# Patient Record
Sex: MALE | Race: WHITE | NOT HISPANIC OR LATINO | Employment: FULL TIME | URBAN - METROPOLITAN AREA
[De-identification: names, ages, dates, MRNs, and addresses within clinical notes are randomized per-mention and may not be internally consistent; named-entity substitution may affect disease eponyms.]

---

## 2021-04-26 ENCOUNTER — OFFICE VISIT CONVERTED (OUTPATIENT)
Dept: UROLOGY | Facility: CLINIC | Age: 36
End: 2021-04-26
Attending: UROLOGY

## 2021-05-11 NOTE — H&P
History and Physical      Patient Name: Babatunde Jane   Patient ID: [id number]   Sex: Male   YOB: 1985        Visit Date: April 26, 2021    Provider: Jarrell Woodson MD   Location: [facility]   Location Address: [address] [place] [address]  [id number]   Location Phone: [phone]          Chief Complaint  · urological issues  · The patient is here for a vasectomy consultation.      History Of Present Illness  Babatunde Jane is here for counseling regarding vasectomy for permanent sterilization.   The procedure was discussed with the patient. Babatunde Jane is aware that the procedure should be considered irreversible, although at times it can be reversed with success. Risks for the procedure including local effects of selling, bleeding, pain, the possibility for recanalization, and continued fertility is also possible. Formation of a sperm granuloma also is a possibility. We discussed the procedure, preoperative preparation, and postoperative care. We also discussed the importance of continuing to use contraception post vasectomy, since the patient will not be sterile at that point. We stressed the importance of continuing to use contraception until a follow-up semen specimen is done that shows the absence of sperm. That specimen will normally be obtained eight weeks post-surgery.   Babatunde Jane is voluntarily requesting this procedure and understands that if it is successful he will be unable to father children.   He has no cardiopulmonary history, no prior scrotal surgery, no blood thinners.          pt does have an area of condyloma centimeter above the penis that has been there for greater than 15 years.  Has not changed in greater than 10 years as far size.  Not bothering him.       Review of Systems  · Constitutional  o Denies  o : fever, chills  · Eyes  o Denies  o : double vision, cataracts  · HENT  o Denies  o : headaches, hearing  "loss  · Cardiovascular  o Denies  o : chest pain, irregular heart beats, dyspnea on exertion, chest pain on exertion  · Respiratory  o Denies  o : shortness of breath, wheezing, cough  · Gastrointestinal  o Denies  o : nausea, vomiting, diarrhea, heartburn  · Genitourinary  o Denies  o : scrotal mass, penile discharge, scrotal abnormalities  · Integument  o Denies  o : rash, itching, new skin lesions  · Endocrine  o Denies  o : weight gain, weight loss  · Psychiatric  o Denies  o : anxiety, depression, difficulty sleeping      Vitals  Date Time BP Position Site L\R Cuff Size HR RR TEMP (F) WT  HT  BMI kg/m2 BSA m2 O2 Sat FR L/min FiO2        04/26/2021 01:40 PM       13  206lbs 6oz 5'  7\" 32.32 2.1             Physical Examination  · Constitutional  o Appearance  o : well developed, well-nourished, in no acute distress.   · Neck  o Inspection/Palpation  o : normal appearance, no masses or tenderness, trachea midline  · Respiratory  o Respiratory Effort  o : breathing unlabored  o Auscultation of Lungs  o : clear to ascultation bilaterally  · Cardiovascular  o Heart  o :   § Auscultation of Heart  § : regular rate and rhythm, no murmurs  · Gastrointestinal  o Abdominal Examination  o : nontender, nondistended, no rigidity or gaurding,no hepatosplenomegaly  · Genitourinary  o Bladder  o : nonpalpable  o Penis  o : normal circumcised phallus with no masses or lesions  o Urethral Meatus  o : no inflammation present and no stenosis  o Scrotum and Scrotal Contents  o :   § Scrotum  § : bilateral vas were palpable  § Testes  § : descended testicles no masses or lesions  · Neurologic  o Mental Status Examination  o :   § Orientation  § : grossly oriented to person, place and time, judgement and insight intact, normal mood and affect         Patient has an area that is about 1 cm x 2.5 cm about 1 cm above the proximal penile shaft.  Some of these are pedunculated and some are flat and raised.  Looks to be condyloma "               Assessment  · Consultation for sterilization     V25.09/Z30.09  · Condyloma acuminata     078.11/A63.0      Plan  · Medications  o Medications have been Reconciled  o Transition of Care or Provider Policy  · Instructions  o The patient will schedule a vasectomy if he desires.  o Handouts were provided, vasectomy  scanned into the EMR for reference.   o Vasectomy is intended to be a permanent form of contraception.  o Vasectomy does not produce immediate sterility.  o Following vasectomy, another form of contraception is required until vas occlusion is confirmed by post-vasectomy semen analysis (PVSA).  o Even after vas occlusion is confirmed, vasectomy is not 100% reliable in preventing pregnancy.  o The risk of pregnancy after vasectomy is approximately 1 in 2,000 for men who have no sperm in the semen on post-vasectomy semen analysis showing rare non-motile sperm (RNMS).  o Repeat vasectomy is necessary in < 1% of vasectomies, provided that a technique for vas occlusion known to have low occlusive failure rate has been used.   o Patient's should refrain from ejaculation for approximately 1 week after vasectomy.  o Options for fertility after vasectomy reversal and sperm retrival with in vitro fertilization. These options are not always successful, and are very expensive.   o The rates of surgical complications such as symptomatic hematoma and infection are 1-2%.  o Chronic scrotal pain associated with negative impact on quality of life occurs after vasectomy in about 1-2% of men. Few of these men require addtional surgery.   o Other permanent and non-permanent alternatives to vasectomy are avaliable.  o Patient voiced understanding of the above statements.  o Electronically Identified Patient Education Materials Provided Electronically       Patient with some condyloma does not change or giving him any problem in greater than 10 years.  We did discuss risk and benefits of removal of this I did  discuss taking ellipse of skin to remove part of this but also fulgurating the wrist.  Risks and benefits were discussed including bleeding, infection and damage to the urinary system.  We also discussed the risk of anesthesia up to and including death.  Patient voiced understanding     At this time he would like to think about whether or not he would like to remove the condyloma.  He will call back if he decides to do this and we will change his vasectomy in the office to a vasectomy in the operating room in combination with removal of suprapubic condyloma.    Patient has decided to schedule a vasectomy in the office             Electronically Signed by: Jarrell Woodson MD -Author on April 26, 2021 02:09:05 PM

## 2021-05-14 ENCOUNTER — PROCEDURE VISIT CONVERTED (OUTPATIENT)
Dept: UROLOGY | Facility: CLINIC | Age: 36
End: 2021-05-14
Attending: UROLOGY

## 2021-05-14 VITALS — BODY MASS INDEX: 32.39 KG/M2 | HEIGHT: 67 IN | RESPIRATION RATE: 13 BRPM | WEIGHT: 206.37 LBS

## 2021-06-05 NOTE — PROCEDURES
Procedure Note      Patient Name: Babatunde Jane   Patient ID: [id number]   Sex: Male   YOB: 1985        Visit Date: May 14, 2021    Provider: Jarrell Woodson MD   Location: [facility]   Location Address: [address] [place] [address]  [id number]   Location Phone: [phone]          Vasectomy Procedure  Procedure: Bilateral Vasectomy   Pre-procedure Diagnosis: Undesired Fertility   Post-procedure Diagnosis: Same   Surgeon: Jarrell Woodson MD   Anesthesia: Local, 10 cc of 1% Lidocaine   Indications Babatunde Jane with undesired fertility, who presents today for bilateral vasectomy for permanent contraception.   Procedure Details:   The patient was appropriately identified, brought into the procedure room, and placed supine on the procedure table. A time was undertaken documenting the correct patient, site and procedure. His scrotum was prepped and draped in the standard sterile fashion. We first approached the right vas deferens. This was identified,  from the spermatic cord structures, and brought to the anterolateral aspect of the hemiscrotum. The local anesthetic solution was injected and once an adequate level of local anesthesia was achieved, a scalpel was used to make a 1 cm incision in the skin overlying the vas deferens. A sharp hemostat was used to dissect the level of the vas deferens and on both of its sides, allowing for ring forceps to be introduced and grasp the vas deferens and externalize it through the skin incision. We then used a combination of sharp and blunt dissection to release the exposed vasal segment from all surrounding tissues. An approximately 1 cm piece of vas deferens was then sharply excised and removed. One blade of a sharp hemostat was used to probe each end of the severed vas deferens, confirming the presence of a vasal lumen. Electocautery was then used to fulgurate both cut surfaces of the severed vas deferens. The abdominal side  of the vas deferens was then placed underneath some perivasal tissues that were closed above it, using a figure-of-eight 3-0 chromic stitch, thus placing the two edges of the severed vas deferens in different tissue planes. Hemostasis was confirmed and the severed vas deferens was allowed to drop back into the scrotum. The skin was closed with a single 3-0 Chromic stitch. We then turned our attention to the left vas deferens. This was also identified and brought to the anterolateral aspect of the ipsilateral hemiscrotum. The local anesthetic was then delivered on this side, and again, the scalpel was used to make an approximately 1 cm inicision in the skin overlying the vas deferens. An identical procedure was then carried out on the vas deferens. Both wounds were dressed with bacitracin ointment, folded 4x4 gauze, and bulky fluffs. The patient tolerated the procedure well and there were no intraoperative or immediate postoperative complications.           Assessment  · Sterilization     V25.2      Plan  · Orders  o Vasectomy (43535) - V25.2 - 05/14/2021  · Medications  o Medications have been Reconciled  o Transition of Care or Provider Policy  · Instructions  o Wound Care discussed.  o Patient reminded to continue another method for contraception, until he has had a post-vasectomy semen analysis that is negative for sperm.. Patient voiced understanding.   o First post-void analysis in 10 weeks.            Electronically Signed by: Jarrell Woodson MD -Author on May 14, 2021 04:04:53 PM